# Patient Record
Sex: MALE | Race: WHITE | URBAN - METROPOLITAN AREA
[De-identification: names, ages, dates, MRNs, and addresses within clinical notes are randomized per-mention and may not be internally consistent; named-entity substitution may affect disease eponyms.]

---

## 2017-04-06 ENCOUNTER — EMERGENCY (EMERGENCY)
Facility: HOSPITAL | Age: 32
LOS: 1 days | End: 2017-04-06
Attending: EMERGENCY MEDICINE | Admitting: EMERGENCY MEDICINE
Payer: MEDICARE

## 2017-04-06 VITALS
DIASTOLIC BLOOD PRESSURE: 101 MMHG | OXYGEN SATURATION: 98 % | HEIGHT: 68 IN | SYSTOLIC BLOOD PRESSURE: 191 MMHG | TEMPERATURE: 99 F | RESPIRATION RATE: 18 BRPM | WEIGHT: 220.46 LBS | HEART RATE: 81 BPM

## 2017-04-06 VITALS
OXYGEN SATURATION: 96 % | HEART RATE: 86 BPM | SYSTOLIC BLOOD PRESSURE: 162 MMHG | TEMPERATURE: 98 F | DIASTOLIC BLOOD PRESSURE: 97 MMHG | RESPIRATION RATE: 16 BRPM

## 2017-04-06 LAB
ALBUMIN SERPL ELPH-MCNC: 4.4 G/DL — SIGNIFICANT CHANGE UP (ref 3.3–5)
ALP SERPL-CCNC: 99 U/L — SIGNIFICANT CHANGE UP (ref 30–120)
ALT FLD-CCNC: 28 U/L DA — SIGNIFICANT CHANGE UP (ref 10–60)
ANION GAP SERPL CALC-SCNC: 9 MMOL/L — SIGNIFICANT CHANGE UP (ref 5–17)
APPEARANCE UR: CLEAR — SIGNIFICANT CHANGE UP
AST SERPL-CCNC: 20 U/L — SIGNIFICANT CHANGE UP (ref 10–40)
BASOPHILS # BLD AUTO: 0.1 K/UL — SIGNIFICANT CHANGE UP (ref 0–0.2)
BASOPHILS NFR BLD AUTO: 0.4 % — SIGNIFICANT CHANGE UP (ref 0–2)
BILIRUB SERPL-MCNC: 0.4 MG/DL — SIGNIFICANT CHANGE UP (ref 0.2–1.2)
BILIRUB UR-MCNC: NEGATIVE — SIGNIFICANT CHANGE UP
BUN SERPL-MCNC: 14 MG/DL — SIGNIFICANT CHANGE UP (ref 7–23)
CALCIUM SERPL-MCNC: 9.4 MG/DL — SIGNIFICANT CHANGE UP (ref 8.4–10.5)
CHLORIDE SERPL-SCNC: 103 MMOL/L — SIGNIFICANT CHANGE UP (ref 96–108)
CO2 SERPL-SCNC: 28 MMOL/L — SIGNIFICANT CHANGE UP (ref 22–31)
COLOR SPEC: YELLOW — SIGNIFICANT CHANGE UP
CREAT SERPL-MCNC: 1.17 MG/DL — SIGNIFICANT CHANGE UP (ref 0.5–1.3)
DIFF PNL FLD: ABNORMAL
EOSINOPHIL # BLD AUTO: 0 K/UL — SIGNIFICANT CHANGE UP (ref 0–0.5)
EOSINOPHIL NFR BLD AUTO: 0.2 % — SIGNIFICANT CHANGE UP (ref 0–6)
GLUCOSE SERPL-MCNC: 136 MG/DL — HIGH (ref 70–99)
GLUCOSE UR QL: NEGATIVE MG/DL — SIGNIFICANT CHANGE UP
HCT VFR BLD CALC: 44 % — SIGNIFICANT CHANGE UP (ref 39–50)
HGB BLD-MCNC: 15.8 G/DL — SIGNIFICANT CHANGE UP (ref 13–17)
KETONES UR-MCNC: NEGATIVE — SIGNIFICANT CHANGE UP
LEUKOCYTE ESTERASE UR-ACNC: NEGATIVE — SIGNIFICANT CHANGE UP
LYMPHOCYTES # BLD AUTO: 1.1 K/UL — SIGNIFICANT CHANGE UP (ref 1–3.3)
LYMPHOCYTES # BLD AUTO: 7.3 % — LOW (ref 13–44)
MCHC RBC-ENTMCNC: 31.9 PG — SIGNIFICANT CHANGE UP (ref 27–34)
MCHC RBC-ENTMCNC: 35.8 GM/DL — SIGNIFICANT CHANGE UP (ref 32–36)
MCV RBC AUTO: 89 FL — SIGNIFICANT CHANGE UP (ref 80–100)
MONOCYTES # BLD AUTO: 0.3 K/UL — SIGNIFICANT CHANGE UP (ref 0–0.9)
MONOCYTES NFR BLD AUTO: 1.9 % — LOW (ref 2–14)
NEUTROPHILS # BLD AUTO: 13.4 K/UL — HIGH (ref 1.8–7.4)
NEUTROPHILS NFR BLD AUTO: 90.2 % — HIGH (ref 43–77)
NITRITE UR-MCNC: NEGATIVE — SIGNIFICANT CHANGE UP
PH UR: 6 — SIGNIFICANT CHANGE UP (ref 4.8–8)
PLATELET # BLD AUTO: 304 K/UL — SIGNIFICANT CHANGE UP (ref 150–400)
POTASSIUM SERPL-MCNC: 3.7 MMOL/L — SIGNIFICANT CHANGE UP (ref 3.5–5.3)
POTASSIUM SERPL-SCNC: 3.7 MMOL/L — SIGNIFICANT CHANGE UP (ref 3.5–5.3)
PROT SERPL-MCNC: 8 G/DL — SIGNIFICANT CHANGE UP (ref 6–8.3)
PROT UR-MCNC: 100 MG/DL
RBC # BLD: 4.94 M/UL — SIGNIFICANT CHANGE UP (ref 4.2–5.8)
RBC # FLD: 11.5 % — SIGNIFICANT CHANGE UP (ref 10.3–14.5)
SODIUM SERPL-SCNC: 140 MMOL/L — SIGNIFICANT CHANGE UP (ref 135–145)
SP GR SPEC: 1.02 — SIGNIFICANT CHANGE UP (ref 1.01–1.02)
UROBILINOGEN FLD QL: NEGATIVE MG/DL — SIGNIFICANT CHANGE UP
WBC # BLD: 14.8 K/UL — HIGH (ref 3.8–10.5)
WBC # FLD AUTO: 14.8 K/UL — HIGH (ref 3.8–10.5)

## 2017-04-06 PROCEDURE — 74176 CT ABD & PELVIS W/O CONTRAST: CPT | Mod: 26

## 2017-04-06 PROCEDURE — 80053 COMPREHEN METABOLIC PANEL: CPT

## 2017-04-06 PROCEDURE — 85027 COMPLETE CBC AUTOMATED: CPT

## 2017-04-06 PROCEDURE — 99284 EMERGENCY DEPT VISIT MOD MDM: CPT | Mod: 25

## 2017-04-06 PROCEDURE — 99284 EMERGENCY DEPT VISIT MOD MDM: CPT

## 2017-04-06 PROCEDURE — 74176 CT ABD & PELVIS W/O CONTRAST: CPT

## 2017-04-06 PROCEDURE — 81001 URINALYSIS AUTO W/SCOPE: CPT

## 2017-04-06 PROCEDURE — 72110 X-RAY EXAM L-2 SPINE 4/>VWS: CPT | Mod: 26

## 2017-04-06 PROCEDURE — 87086 URINE CULTURE/COLONY COUNT: CPT

## 2017-04-06 PROCEDURE — 72110 X-RAY EXAM L-2 SPINE 4/>VWS: CPT

## 2017-04-06 PROCEDURE — 96374 THER/PROPH/DIAG INJ IV PUSH: CPT

## 2017-04-06 RX ORDER — NITROGLYCERIN 6.5 MG
0.4 CAPSULE, EXTENDED RELEASE ORAL ONCE
Qty: 0 | Refills: 0 | Status: DISCONTINUED | OUTPATIENT
Start: 2017-04-06 | End: 2017-04-06

## 2017-04-06 RX ORDER — SODIUM CHLORIDE 9 MG/ML
1000 INJECTION INTRAMUSCULAR; INTRAVENOUS; SUBCUTANEOUS ONCE
Qty: 0 | Refills: 0 | Status: COMPLETED | OUTPATIENT
Start: 2017-04-06 | End: 2017-04-06

## 2017-04-06 RX ORDER — LISINOPRIL 2.5 MG/1
1 TABLET ORAL
Qty: 0 | Refills: 0 | COMMUNITY

## 2017-04-06 RX ORDER — KETOROLAC TROMETHAMINE 30 MG/ML
30 SYRINGE (ML) INJECTION ONCE
Qty: 0 | Refills: 0 | Status: DISCONTINUED | OUTPATIENT
Start: 2017-04-06 | End: 2017-04-06

## 2017-04-06 RX ORDER — IPRATROPIUM/ALBUTEROL SULFATE 18-103MCG
3 AEROSOL WITH ADAPTER (GRAM) INHALATION ONCE
Qty: 0 | Refills: 0 | Status: DISCONTINUED | OUTPATIENT
Start: 2017-04-06 | End: 2017-04-06

## 2017-04-06 RX ORDER — TAMSULOSIN HYDROCHLORIDE 0.4 MG/1
0.4 CAPSULE ORAL ONCE
Qty: 0 | Refills: 0 | Status: COMPLETED | OUTPATIENT
Start: 2017-04-06 | End: 2017-04-06

## 2017-04-06 RX ORDER — TAMSULOSIN HYDROCHLORIDE 0.4 MG/1
1 CAPSULE ORAL
Qty: 7 | Refills: 0 | OUTPATIENT
Start: 2017-04-06 | End: 2017-04-13

## 2017-04-06 RX ORDER — AMLODIPINE BESYLATE 2.5 MG/1
5 TABLET ORAL ONCE
Qty: 0 | Refills: 0 | Status: COMPLETED | OUTPATIENT
Start: 2017-04-06 | End: 2017-04-06

## 2017-04-06 RX ORDER — ATENOLOL 25 MG/1
1 TABLET ORAL
Qty: 0 | Refills: 0 | COMMUNITY

## 2017-04-06 RX ORDER — IBUPROFEN 200 MG
1 TABLET ORAL
Qty: 20 | Refills: 0 | OUTPATIENT
Start: 2017-04-06 | End: 2017-04-11

## 2017-04-06 RX ORDER — AMLODIPINE BESYLATE 2.5 MG/1
1 TABLET ORAL
Qty: 0 | Refills: 0 | COMMUNITY

## 2017-04-06 RX ORDER — ASPIRIN/CALCIUM CARB/MAGNESIUM 324 MG
325 TABLET ORAL ONCE
Qty: 0 | Refills: 0 | Status: DISCONTINUED | OUTPATIENT
Start: 2017-04-06 | End: 2017-04-06

## 2017-04-06 RX ADMIN — AMLODIPINE BESYLATE 5 MILLIGRAM(S): 2.5 TABLET ORAL at 18:00

## 2017-04-06 RX ADMIN — Medication 0.2 MILLIGRAM(S): at 18:32

## 2017-04-06 RX ADMIN — Medication 30 MILLIGRAM(S): at 17:25

## 2017-04-06 RX ADMIN — Medication 30 MILLIGRAM(S): at 18:01

## 2017-04-06 RX ADMIN — SODIUM CHLORIDE 1000 MILLILITER(S): 9 INJECTION INTRAMUSCULAR; INTRAVENOUS; SUBCUTANEOUS at 17:26

## 2017-04-06 RX ADMIN — TAMSULOSIN HYDROCHLORIDE 0.4 MILLIGRAM(S): 0.4 CAPSULE ORAL at 20:03

## 2017-04-06 NOTE — ED ADULT NURSE REASSESSMENT NOTE - NS ED NURSE REASSESS COMMENT FT1
accessed [facility] pt portal and obtained pt's previous meds although non compliant  Patient rounding provided for. Patient in no apparent distress. Resting comfortably. Patient provided emotional support. Comfort, safety, and review of plan of care addressed with pt. Will continue to monitor pt.

## 2017-04-06 NOTE — ED ADULT NURSE REASSESSMENT NOTE - NS ED NURSE REASSESS COMMENT FT1
pt with  h/o htn on 3 different b/p meds but non compliant 1 fish oil amlodipine and another medication taken off hctz

## 2017-04-06 NOTE — ED ADULT NURSE REASSESSMENT NOTE - NS ED NURSE REASSESS COMMENT FT1
Received pt in bed, AAO x3, [facility], has hearing-aids . B/L BS clear, respiration even and unlabored. CT scan resulted, pt states pain improved and tolerable, left flank area pain 3/10. /92, will continue to monitor.

## 2017-04-06 NOTE — ED PROVIDER NOTE - PROGRESS NOTE DETAILS
results of labs and ct discussed, advised to take his bp medications, copy of results given, rx for flomax and ibuprofen given, advised follow up with pmd and urologist

## 2017-04-06 NOTE — ED PROVIDER NOTE - OBJECTIVE STATEMENT
left flank pain began today  no change in urinary output  no fever, no nvd left flank pain began today  no change in urinary output  no fever, no nvd    patient states has hx of htn and does not take his meds left flank pain began today  no change in urinary output  no fever, no nvd  No PMD, states he is a , lives in [place]  patient states has hx of htn and does not take his meds left flank pain began today, no trauma  no change in urinary output  no fever, no nvd  No PMD, states he is a , lives in [place]  patient states has hx of htn and does not take his meds

## 2017-04-06 NOTE — ED PROVIDER NOTE - ATTENDING CONTRIBUTION TO CARE
Patient came in with flank pain work up for possible stone negative advice patient to fallow uop with PMD Patient came in with flank pain, rule out possibility of stone, agreed with the plan of care by the PA. On Exam patient with mild to moderate flank pain abdomen benign, ambulatory to ER. Fallow up of the CT scan and blood work show possibility of recent passing of ureteral stone will treat as such advice to fallow up with Urologist.

## 2017-04-07 LAB
CULTURE RESULTS: NO GROWTH — SIGNIFICANT CHANGE UP
SPECIMEN SOURCE: SIGNIFICANT CHANGE UP

## 2023-06-02 NOTE — ED PROVIDER NOTE - RADIATION
abdomen/left lower abdomen
I have personally seen and examined the patient. I have collaborated with and supervised the

## 2025-02-16 NOTE — ED ADULT TRIAGE NOTE - HEIGHT IN FEET
Chief Complaint   Patient presents with    Knee Pain     C/o posterior right knee pain after \"stepping wrong\" out of the truck. Denies fall, no twisting motion or extension, states she \"stepped heavy or wrong\" and felt a pop in the back of her knee. No other injury, no prior surgeries on that knee.        SUBJECTIVE:  HPI:   This 58 year old  female presents to Urgent Care today for evaluation of right knee pain. Patient's chief complaint documented by nursing staff was reviewed. Patient reports she started with right knee pain after stepping wrong when she got out of the truck today. Reports she thinks the step down was too far for her to reach and she landed on her foot hard. Did not fall when she was injured. Reports she heard a \"pop\" in the back of the right knee and the knee felt like it was going to collapse. Reports pain is in the back of the knee. Is having difficulty walking on the leg. Reports pain with straightening the knee and weight bearing. Patient did not take anything for her symptoms, has not applied ice. Denies previous history of knee problems.     Review of Systems:  A review of systems was performed and findings relevant to these symptoms are included in the HPI.    Current Outpatient Medications   Medication Sig Dispense Refill    gabapentin (NEURONTIN) 300 MG capsule [None received]      lisinopril (ZESTRIL) 20 MG tablet       omeprazole (PrilOSEC) 20 MG capsule        No current facility-administered medications for this visit.       ALLERGIES:  No Known Allergies      OBJECTIVE:  Vitals:    02/16/25 1402   BP: 128/80   BP Location: RUE - Right upper extremity   Patient Position: Sitting   Pulse: 77   Resp: 18   Temp: 98.3 °F (36.8 °C)   TempSrc: Oral   SpO2: 95%   PainSc: 10    PainLoc: Knee       Vitals were reviewed.     Physical Exam:  Constitutional: This pleasant 58 year old female presents to Urgent Care in no acute distress. Patient is alert and oriented x3.  Musculoskeletal: Exam of  the right knee reveals no observable swelling, induration, effusion, erythema, or ecchymosis. Patient has full active and passive range of motion. Increased pain with full extension of the knee. Palpation of the joint reveals no joint line tenderness or irregularity. Tenderness with palpation of posterior knee over distal hamstring.     Diagnostics:  XR KNEE 3 VIEWS RIGHT    Result Date: 2/16/2025  XR KNEE 3 VIEWS RIGHT CLINICAL INFORMATION: Pain after injury. COMPARISON: None. FINDINGS:  The is no joint effusion. Alignment is maintained. There is no evidence for acute fracture or dislocation.  The joint spaces are preserved. There is no radiopaque foreign body.     IMPRESSION: No acute findings. Electronically Signed by: Dhruv Basilio DO Signed on: 2/16/2025 2:50 PM Created on Workstation ID: [id number] Signed on Workstation ID: [id number]      Clinical Course:  Suspect she strained her hamstring muscle. Patient was discharged with crutches to help her with ambulation.     Assessment:  Right hamstring strain    Plan:   - Weight bearing as tolerated.   - Apply ice 20 minutes at a time frequently and elevate as much as possible over the next 24-48 hours.  - May take Ibuprofen up to 800mg with food every 8 hours as needed for pain.  - Written discharge instructions were given for muscle strain.   - Follow up with PCP in 5 days if symptoms do not improve, sooner if symptoms worsen.    RICCARDO Smith   5